# Patient Record
Sex: FEMALE | Race: WHITE | NOT HISPANIC OR LATINO | Employment: UNEMPLOYED | URBAN - NONMETROPOLITAN AREA
[De-identification: names, ages, dates, MRNs, and addresses within clinical notes are randomized per-mention and may not be internally consistent; named-entity substitution may affect disease eponyms.]

---
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
## 2024-04-24 ENCOUNTER — NURSE TRIAGE (OUTPATIENT)
Dept: PEDIATRICS | Facility: OTHER | Age: 19
End: 2024-04-24
Payer: COMMERCIAL

## 2024-04-24 NOTE — TELEPHONE ENCOUNTER
"Called patient back. Patient states she yesterday morning woke up with scratchy throat, super tired.  Went to work yesterday.  Last night it was worse.  Today can't swallow.  Is very congested.  Someone she was with just tested positive with strep on Monday.   Temp 99.5 tymp right is 99.8 tympanic left.  Rates sore throat pain 10/10.  Back of throat is really red. Hurts to swallow.    Protocol recommends patient to be seen in clinic.  Patient states she is unable to come in to the clinic today or tomorrow.  She is requesting something for this.    Patient is now over 18 years old and plans to see Dr. Micaela May as an adult.    Maricruz Anne RN on 4/24/2024 at 10:08 AM      Reason for Disposition   SEVERE sore throat pain    Additional Information   Negative: SEVERE difficulty breathing (e.g., struggling for each breath, speaks in single words)   Negative: Sounds like a life-threatening emergency to the triager   Negative: Throat culture results, call about   Negative: Productive cough is main symptom   Negative: Runny nose is main symptom   Negative: Drooling or spitting out saliva (because can't swallow)   Negative: Unable to open mouth completely   Negative: Drinking very little and has signs of dehydration (e.g., no urine > 12 hours, very dry mouth, very lightheaded)   Negative: Patient sounds very sick or weak to the triager   Negative: Difficulty breathing (per caller) but not severe   Negative: Fever > 103 F (39.4 C)   Negative: Refuses to drink anything for > 12 hours    Answer Assessment - Initial Assessment Questions  1. ONSET: \"When did the throat start hurting?\" (Hours or days ago)       Yesterday 4/23/24  2. SEVERITY: \"How bad is the sore throat?\" (Scale 1-10; mild, moderate or severe)    - MILD (1-3):  Doesn't interfere with eating or normal activities.    - MODERATE (4-7): Interferes with eating some solids and normal activities.    - SEVERE (8-10):  Excruciating pain, interferes with most " Daily Note     Today's date: 3/11/2022  Patient name: Nelly Ferrer  : 1966  MRN: [id number]  Referring provider: Maryann Kendall DPM  Dx:   Encounter Diagnosis     ICD-10-CM    1  Other acquired deformities of left foot  M21 6X2    2  Left foot pain  M79 672    3  Hallux valgus of left foot  M20 12        Start Time: 1515  Stop Time: 1600  Total time in clinic (min): 45 minutes    Subjective: Patient reports mild improvements in function since last visit  Patient still reports constant, highly irritable left foot symptoms  Objective: See treatment diary below      Assessment: Repeated motion testing inconclusive today due to irritability of symptoms  Left foot pain and low back pain both easily reproduced with passive and active movements of the lumbar spine  Pain and n/t in patients left foot follows L4-5 dermatomes and radiates up into leg and back at times  Patient presentation consistent with a L4-5 nerve root irritation  Will reach out to referring provider regarding use of medications to control nerve root irritation  Patient would benefit from continued PT      Plan: Continue per plan of care  Precautions: Severe hallux valgus deformity R foot         Manuals 2/23 3/1 3/4 3/8 3/11        Gentle IASTM for desensitization             Re-Assessment    45'         Central Gliders   3x10 L4-5 right sidelying  3x10 L4-5 right sidelying        LASER  4' 14 W no DC 4' 10 W no dc discharge         Neuro Re-Ed             Weight shifting             NBOS                                                                 Re-Assessment  30'           Ther Ex             Ankle pumps A/P, ML 20x HEP            Toe crunches 20x HEP  reviewed          HEP Ice/elev and desensitization            Sciatic Nerve Glides: Common Fib Bias  2x10 HEP           Prone Hips Off-Center to Right    2x10         Standing RLSG    2x10 2x10        Supine Flexion     2x10 w/ PT OP        Supine Flexion right Rotation     2x1- w/ "normal activities.    - SEVERE WITH DYSPHAGIA (10): Can't swallow liquids, drooling.      10/10    3. STREP EXPOSURE: \"Has there been any exposure to strep within the past week?\" If Yes, ask: \"What type of contact occurred?\"       Yes. Person she was exposed to tested positive for strep on Monday.    4.  VIRAL SYMPTOMS: \"Are there any symptoms of a cold, such as a runny nose, cough, hoarse voice or red eyes?\"       Runny nose, no cough, hoarse voice, sore throat    5. FEVER: \"Do you have a fever?\" If Yes, ask: \"What is your temperature, how was it measured, and when did it start?\"      Yes, 99.5 tympanic on right and 99.8 tympanic on left ear.    6. PUS ON THE TONSILS: \"Is there pus on the tonsils in the back of your throat?\"      She did and there was and the back of her throat is very red    7. OTHER SYMPTOMS: \"Do you have any other symptoms?\" (e.g., difficulty breathing, headache, rash)      Sunday night had a headache, was around lady who was positive on Monday    8. PREGNANCY: \"Is there any chance you are pregnant?\" \"When was your last menstrual period?\"      No    Protocols used: Sore Throat-A-[place]    " PT OP        Ther Activity                                       Gait Training                                       Modalities

## 2024-04-24 NOTE — TELEPHONE ENCOUNTER
Pt has a sore throat and she was exposed to strep throat last week.  Please call.    León Gaytan on 4/24/2024 at 7:37 AM

## 2024-04-24 NOTE — TELEPHONE ENCOUNTER
Called patient back to inform her of Dr. Chel Aaron's recommendation.  Patient was offered a link via email or text to establish a MyChart so she could sign up for an e-visit.  Patient stated she will look at her schedule and  make plans to come in to the clinic in the next few days.    She declined an e-visit at this time. She was also offered the [facility] which closes at 7 pm. Patient was instructed to call back or go straight to the ED if her symptoms worsen. Patient agreed with this instruction.    Maricruz Anne RN on 4/24/2024 at 12:25 PM

## (undated) RX ORDER — IBUPROFEN 400 MG/1
TABLET, FILM COATED ORAL
Status: DISPENSED
Start: 2018-03-09

## (undated) RX ORDER — IBUPROFEN 200 MG
TABLET ORAL
Status: DISPENSED
Start: 2018-03-09

## (undated) RX ORDER — ONDANSETRON 4 MG/1
TABLET, ORALLY DISINTEGRATING ORAL
Status: DISPENSED
Start: 2018-03-09